# Patient Record
Sex: MALE | Race: WHITE | NOT HISPANIC OR LATINO | Employment: FULL TIME | URBAN - METROPOLITAN AREA
[De-identification: names, ages, dates, MRNs, and addresses within clinical notes are randomized per-mention and may not be internally consistent; named-entity substitution may affect disease eponyms.]

---

## 2023-09-13 ENCOUNTER — APPOINTMENT (OUTPATIENT)
Dept: PRIMARY CARE | Facility: CLINIC | Age: 53
End: 2023-09-13
Payer: COMMERCIAL

## 2023-09-18 DIAGNOSIS — Z00.00 HEALTHCARE MAINTENANCE: Primary | ICD-10-CM

## 2023-09-18 NOTE — PROGRESS NOTES
Requesting lab work ahead of CPE.    Referred To Mid-Level For Closure Text (Leave Blank If You Do Not Want): After obtaining clear surgical margins the patient was sent to a mid-level provider for surgical repair.  The patient understands they will receive post-surgical care and follow-up from the mid-level provider.

## 2023-09-20 ENCOUNTER — OFFICE VISIT (OUTPATIENT)
Dept: PRIMARY CARE | Facility: CLINIC | Age: 53
End: 2023-09-20
Payer: COMMERCIAL

## 2023-09-20 ENCOUNTER — LAB (OUTPATIENT)
Dept: LAB | Facility: LAB | Age: 53
End: 2023-09-20
Payer: COMMERCIAL

## 2023-09-20 VITALS
HEIGHT: 66 IN | OXYGEN SATURATION: 96 % | HEART RATE: 93 BPM | DIASTOLIC BLOOD PRESSURE: 97 MMHG | TEMPERATURE: 97.8 F | BODY MASS INDEX: 33.91 KG/M2 | SYSTOLIC BLOOD PRESSURE: 153 MMHG | WEIGHT: 211 LBS

## 2023-09-20 DIAGNOSIS — I10 BENIGN ESSENTIAL HYPERTENSION: ICD-10-CM

## 2023-09-20 DIAGNOSIS — Z00.00 HEALTHCARE MAINTENANCE: Primary | ICD-10-CM

## 2023-09-20 DIAGNOSIS — E66.09 CLASS 1 OBESITY DUE TO EXCESS CALORIES WITHOUT SERIOUS COMORBIDITY WITH BODY MASS INDEX (BMI) OF 34.0 TO 34.9 IN ADULT: ICD-10-CM

## 2023-09-20 DIAGNOSIS — Z00.00 HEALTHCARE MAINTENANCE: ICD-10-CM

## 2023-09-20 PROBLEM — R97.20 ELEVATED PSA: Status: RESOLVED | Noted: 2023-09-20 | Resolved: 2023-09-20

## 2023-09-20 PROBLEM — H61.20 IMPACTED CERUMEN: Status: RESOLVED | Noted: 2023-09-20 | Resolved: 2023-09-20

## 2023-09-20 LAB
ALANINE AMINOTRANSFERASE (SGPT) (U/L) IN SER/PLAS: 28 U/L (ref 10–52)
ALBUMIN (G/DL) IN SER/PLAS: 4.8 G/DL (ref 3.4–5)
ALKALINE PHOSPHATASE (U/L) IN SER/PLAS: 55 U/L (ref 33–120)
ANION GAP IN SER/PLAS: 14 MMOL/L (ref 10–20)
ASPARTATE AMINOTRANSFERASE (SGOT) (U/L) IN SER/PLAS: 20 U/L (ref 9–39)
BASOPHILS (10*3/UL) IN BLOOD BY AUTOMATED COUNT: 0.03 X10E9/L (ref 0–0.1)
BASOPHILS/100 LEUKOCYTES IN BLOOD BY AUTOMATED COUNT: 0.5 % (ref 0–2)
BILIRUBIN TOTAL (MG/DL) IN SER/PLAS: 0.8 MG/DL (ref 0–1.2)
CALCIUM (MG/DL) IN SER/PLAS: 10.4 MG/DL (ref 8.6–10.6)
CARBON DIOXIDE, TOTAL (MMOL/L) IN SER/PLAS: 28 MMOL/L (ref 21–32)
CHLORIDE (MMOL/L) IN SER/PLAS: 101 MMOL/L (ref 98–107)
CHOLESTEROL (MG/DL) IN SER/PLAS: 204 MG/DL (ref 0–199)
CHOLESTEROL IN HDL (MG/DL) IN SER/PLAS: 55.3 MG/DL
CHOLESTEROL/HDL RATIO: 3.7
CREATININE (MG/DL) IN SER/PLAS: 1.17 MG/DL (ref 0.5–1.3)
EOSINOPHILS (10*3/UL) IN BLOOD BY AUTOMATED COUNT: 0.03 X10E9/L (ref 0–0.7)
EOSINOPHILS/100 LEUKOCYTES IN BLOOD BY AUTOMATED COUNT: 0.5 % (ref 0–6)
ERYTHROCYTE DISTRIBUTION WIDTH (RATIO) BY AUTOMATED COUNT: 12.1 % (ref 11.5–14.5)
ERYTHROCYTE MEAN CORPUSCULAR HEMOGLOBIN CONCENTRATION (G/DL) BY AUTOMATED: 33.2 G/DL (ref 32–36)
ERYTHROCYTE MEAN CORPUSCULAR VOLUME (FL) BY AUTOMATED COUNT: 87 FL (ref 80–100)
ERYTHROCYTES (10*6/UL) IN BLOOD BY AUTOMATED COUNT: 5.53 X10E12/L (ref 4.5–5.9)
ESTIMATED AVERAGE GLUCOSE FOR HBA1C: 103 MG/DL
GFR MALE: 74 ML/MIN/1.73M2
GLUCOSE (MG/DL) IN SER/PLAS: 89 MG/DL (ref 74–99)
HEMATOCRIT (%) IN BLOOD BY AUTOMATED COUNT: 47.9 % (ref 41–52)
HEMOGLOBIN (G/DL) IN BLOOD: 15.9 G/DL (ref 13.5–17.5)
HEMOGLOBIN A1C/HEMOGLOBIN TOTAL IN BLOOD: 5.2 %
IMMATURE GRANULOCYTES/100 LEUKOCYTES IN BLOOD BY AUTOMATED COUNT: 0.3 % (ref 0–0.9)
LDL: 126 MG/DL (ref 0–99)
LEUKOCYTES (10*3/UL) IN BLOOD BY AUTOMATED COUNT: 6.7 X10E9/L (ref 4.4–11.3)
LYMPHOCYTES (10*3/UL) IN BLOOD BY AUTOMATED COUNT: 2.1 X10E9/L (ref 1.2–4.8)
LYMPHOCYTES/100 LEUKOCYTES IN BLOOD BY AUTOMATED COUNT: 31.6 % (ref 13–44)
MONOCYTES (10*3/UL) IN BLOOD BY AUTOMATED COUNT: 0.58 X10E9/L (ref 0.1–1)
MONOCYTES/100 LEUKOCYTES IN BLOOD BY AUTOMATED COUNT: 8.7 % (ref 2–10)
NEUTROPHILS (10*3/UL) IN BLOOD BY AUTOMATED COUNT: 3.89 X10E9/L (ref 1.2–7.7)
NEUTROPHILS/100 LEUKOCYTES IN BLOOD BY AUTOMATED COUNT: 58.4 % (ref 40–80)
NRBC (PER 100 WBCS) BY AUTOMATED COUNT: 0 /100 WBC (ref 0–0)
PLATELETS (10*3/UL) IN BLOOD AUTOMATED COUNT: 274 X10E9/L (ref 150–450)
POTASSIUM (MMOL/L) IN SER/PLAS: 4.5 MMOL/L (ref 3.5–5.3)
PROSTATE SPECIFIC ANTIGEN,SCREEN: 4.37 NG/ML (ref 0–4)
PROTEIN TOTAL: 7.8 G/DL (ref 6.4–8.2)
SODIUM (MMOL/L) IN SER/PLAS: 138 MMOL/L (ref 136–145)
TRIGLYCERIDE (MG/DL) IN SER/PLAS: 115 MG/DL (ref 0–149)
UREA NITROGEN (MG/DL) IN SER/PLAS: 17 MG/DL (ref 6–23)
VLDL: 23 MG/DL (ref 0–40)

## 2023-09-20 PROCEDURE — 90471 IMMUNIZATION ADMIN: CPT | Performed by: STUDENT IN AN ORGANIZED HEALTH CARE EDUCATION/TRAINING PROGRAM

## 2023-09-20 PROCEDURE — 80053 COMPREHEN METABOLIC PANEL: CPT

## 2023-09-20 PROCEDURE — 99396 PREV VISIT EST AGE 40-64: CPT | Performed by: STUDENT IN AN ORGANIZED HEALTH CARE EDUCATION/TRAINING PROGRAM

## 2023-09-20 PROCEDURE — 1036F TOBACCO NON-USER: CPT | Performed by: STUDENT IN AN ORGANIZED HEALTH CARE EDUCATION/TRAINING PROGRAM

## 2023-09-20 PROCEDURE — 3077F SYST BP >= 140 MM HG: CPT | Performed by: STUDENT IN AN ORGANIZED HEALTH CARE EDUCATION/TRAINING PROGRAM

## 2023-09-20 PROCEDURE — 80061 LIPID PANEL: CPT

## 2023-09-20 PROCEDURE — 36415 COLL VENOUS BLD VENIPUNCTURE: CPT

## 2023-09-20 PROCEDURE — 3008F BODY MASS INDEX DOCD: CPT | Performed by: STUDENT IN AN ORGANIZED HEALTH CARE EDUCATION/TRAINING PROGRAM

## 2023-09-20 PROCEDURE — 84153 ASSAY OF PSA TOTAL: CPT

## 2023-09-20 PROCEDURE — 85025 COMPLETE CBC W/AUTO DIFF WBC: CPT

## 2023-09-20 PROCEDURE — 90686 IIV4 VACC NO PRSV 0.5 ML IM: CPT | Performed by: STUDENT IN AN ORGANIZED HEALTH CARE EDUCATION/TRAINING PROGRAM

## 2023-09-20 PROCEDURE — 83036 HEMOGLOBIN GLYCOSYLATED A1C: CPT

## 2023-09-20 PROCEDURE — 90750 HZV VACC RECOMBINANT IM: CPT | Performed by: STUDENT IN AN ORGANIZED HEALTH CARE EDUCATION/TRAINING PROGRAM

## 2023-09-20 PROCEDURE — 90472 IMMUNIZATION ADMIN EACH ADD: CPT | Performed by: STUDENT IN AN ORGANIZED HEALTH CARE EDUCATION/TRAINING PROGRAM

## 2023-09-20 PROCEDURE — 3080F DIAST BP >= 90 MM HG: CPT | Performed by: STUDENT IN AN ORGANIZED HEALTH CARE EDUCATION/TRAINING PROGRAM

## 2023-09-20 RX ORDER — LISINOPRIL 20 MG/1
20 TABLET ORAL DAILY
Qty: 30 TABLET | Refills: 5 | Status: SHIPPED | OUTPATIENT
Start: 2023-09-20 | End: 2024-03-07

## 2023-09-20 RX ORDER — FLUTICASONE PROPIONATE 50 MCG
1 SPRAY, SUSPENSION (ML) NASAL 2 TIMES DAILY
COMMUNITY
Start: 2022-03-16

## 2023-09-20 RX ORDER — AMLODIPINE BESYLATE 10 MG/1
10 TABLET ORAL DAILY
COMMUNITY
End: 2024-03-01

## 2023-09-20 NOTE — PATIENT INSTRUCTIONS
Your lab work is pending at time of this note.     I will contact you with the results at my soonest convenience. I strongly urge you to use [employer] as this is the quickest and easiest way to access your results and recieve my correspondences.     I have added or changed your medications today. See the medication section of this packet for full details.      You received your flu shot today!    You received your first shingrix shot today! Follow up in 2-6 months to complete this two-step series.      Schedule a follow up the first week of December.

## 2023-09-20 NOTE — PROGRESS NOTES
"Subjective   Patient ID: Kale Rachel is a 53 y.o. male who presents for No chief complaint on file..    HPI       Re: ?HTN - elevated BP once again despite being on amlodipine. Has lost ~10lb as tax season has ended, but still not at goal blood pressure. Reports no sx high or low from HTN; denies blurry vision, HA, dizziness LoC CP SoB Galeas and leg swelling.     Re: HM - due for routine blood work, done this AM, results pending. Immunizations UTD. CRS and PSA are current.      PMHx, FHx, Social Hx, Surg Hx personally reviewed at this appointment. No pertinent findings and/or changes from prior (if applicable).     ROS: Denies wt gain/loss f/c HA LoC CP SOB NVDC. See HPI above, and scanned sheet (if applicable). All other systems are reviewed and are without complaint.        Review of Systems    Objective   BP (!) 153/97   Pulse 93   Temp 36.6 °C (97.8 °F)   Ht 1.676 m (5' 6\")   Wt 95.7 kg (211 lb)   SpO2 96%   BMI 34.06 kg/m²     Physical Exam  Gen: obese, NAD. AAO x3.  HEENT: NC/AT. Anicteric sclera, symmetric pupils. MMM no thrush.  Neck: Soft, supple. No LAD. No goiter.  CV: RRR nl s1s2 no m/r/g  Pulm: CTAB no w/r/r, good air exchange  GI: obese, soft NTND BS+ no hsm  Ext: WWP no edema  Neuro: II-XII grossly intact, nonfocal systemic findings  MSK: 5/5 strength b/l UE and LE  Gait: unremarkable     Lab Results   Component Value Date    WBC 5.8 07/12/2021    HGB 15.0 07/12/2021    HCT 43.0 07/12/2021     07/12/2021    CHOL 223 (H) 03/02/2021    TRIG 125 03/02/2021    HDL 51.5 03/02/2021    ALT 24 07/12/2021    AST 21 07/12/2021     07/12/2021    K 4.5 07/12/2021     07/12/2021    CREATININE 1.11 07/12/2021    BUN 18 07/12/2021    CO2 30 07/12/2021    TSH 0.95 05/09/2019    PSA 3.18 11/11/2019    HGBA1C 5.1 05/09/2019     par    Assessment/Plan   # HTN: not at goal  - ambulatory pressures, RTC 4-8 weeks with BP log  - routine blood/urine work, if not recent  - lifestyle modifications " discussed at length   - add Lisinopril 20mg to regimen    # Obesity  - counselled on wt loss via diet, exercise, and other lifestyle modifications     # Health Maintenance  - routine blood work  - Colonoscopy: Cologuard 2021, repeat 2024  - PSA: UTD  - Immunizations: VZV 1 of 2, flu shot      Problem List Items Addressed This Visit       Benign essential hypertension    Relevant Medications    lisinopril 20 mg tablet     Other Visit Diagnoses       Healthcare maintenance    -  Primary    Class 1 obesity due to excess calories without serious comorbidity with body mass index (BMI) of 34.0 to 34.9 in adult

## 2023-09-26 PROBLEM — Z79.2 NEED FOR PROPHYLACTIC ANTIBIOTIC: Status: ACTIVE | Noted: 2023-09-26

## 2023-09-26 PROBLEM — R07.89 ATYPICAL CHEST PAIN: Status: ACTIVE | Noted: 2023-09-26

## 2023-09-26 PROBLEM — R30.0 DYSURIA: Status: ACTIVE | Noted: 2023-09-26

## 2023-09-26 PROBLEM — H90.12 CONDUCTIVE HEARING LOSS OF LEFT EAR WITH UNRESTRICTED HEARING OF RIGHT EAR: Status: ACTIVE | Noted: 2023-09-26

## 2023-09-26 PROBLEM — H93.19 TINNITUS: Status: ACTIVE | Noted: 2023-09-26

## 2023-09-26 PROBLEM — R03.0 ELEVATED BLOOD PRESSURE READING: Status: ACTIVE | Noted: 2023-09-26

## 2023-09-26 PROBLEM — Z86.39 HISTORY OF OBESITY: Status: ACTIVE | Noted: 2023-09-26

## 2023-09-26 PROBLEM — R33.9 INCOMPLETE BLADDER EMPTYING: Status: ACTIVE | Noted: 2023-09-26

## 2023-09-26 PROBLEM — H93.8X2 PRESSURE SENSATION IN LEFT EAR: Status: ACTIVE | Noted: 2023-09-26

## 2023-09-26 PROBLEM — N52.9 ERECTILE DYSFUNCTION: Status: ACTIVE | Noted: 2023-09-26

## 2023-09-26 PROBLEM — I86.1 VARICOCELE: Status: ACTIVE | Noted: 2023-09-26

## 2023-09-26 PROBLEM — H91.90 HEARING LOSS: Status: ACTIVE | Noted: 2023-09-26

## 2023-09-26 RX ORDER — MELOXICAM 15 MG/1
15 TABLET ORAL DAILY PRN
COMMUNITY

## 2023-10-02 ENCOUNTER — TREATMENT (OUTPATIENT)
Dept: PHYSICAL THERAPY | Facility: CLINIC | Age: 53
End: 2023-10-02
Payer: COMMERCIAL

## 2023-10-02 DIAGNOSIS — M54.9 RIGHT-SIDED BACK PAIN: Primary | ICD-10-CM

## 2023-10-02 DIAGNOSIS — M54.9 BACK PAIN: ICD-10-CM

## 2023-10-02 PROCEDURE — 97110 THERAPEUTIC EXERCISES: CPT | Mod: GP

## 2023-10-02 NOTE — PROGRESS NOTES
Physical Therapy  Physical Therapy Treatment    Patient Name: Kale Rachel  MRN: [id number]  Today's Date: 10/2/2023  Time Calculation  Start Time: 1822      Assessment: reported feeling better post treatment and more challenged with balance activities on R.  Improved since starting therapy.  Would benefit from a few additional therapy sessions to make sure he knows how to control and better manage his pain via correct HEP and postural modifications.        Plan: 1 time a week for 2-3 additional sessions.    Core work, balance stability     Subjective:   Subjective   Patient reports no pain currently.  Had pain for about 2 days after last visit but after that it was gone.  This past weekend went to beach and picked up beach glass then went hiking about 4 miles.  Had some soreness on R hip since then but that has resolved.     Objective:   Objective   L/S ROM  Flexion WFL  Ext WFL  SB WFL but pain R with L SB    Lower quarter screen:  mild pain on R going L  Repeated movements: NT    Core strength supine sit arms straight    HS   R 90  L 80      Treatments:   Therapeutic Exercise  Therapeutic Exercise Activity 1: Bike 5 min  Therapeutic Exercise Activity 2: press ups 10 x  Therapeutic Exercise Activity 3: prone  shoulder flexion 10 x  Therapeutic Exercise Activity 4: prone hip ext 10 x  Therapeutic Exercise Activity 5: bird/dog prone 10 x 2  Therapeutic Exercise Activity 6: alt heel tap 20 x  Therapeutic Exercise Activity 7: bicycles cw & ccw 15 x  Therapeutic Exercise Activity 8: scissors 10 x  Therapeutic Exercise Activity 9: palloff press 10# 12 x 2 B  Therapeutic Exercise Activity 10: farmers carry 12 kg 4 x 30'  Therapeutic Exercise Activity 11: plank 10 x 5 sec hold  Therapeutic Exercise Activity 12: row 10# 12 x3         Zenaida Veronica, PT

## 2023-12-04 ENCOUNTER — LAB (OUTPATIENT)
Dept: LAB | Facility: LAB | Age: 53
End: 2023-12-04
Payer: COMMERCIAL

## 2023-12-04 ENCOUNTER — OFFICE VISIT (OUTPATIENT)
Dept: PRIMARY CARE | Facility: CLINIC | Age: 53
End: 2023-12-04
Payer: COMMERCIAL

## 2023-12-04 VITALS
HEIGHT: 66 IN | OXYGEN SATURATION: 97 % | HEART RATE: 68 BPM | WEIGHT: 215.4 LBS | DIASTOLIC BLOOD PRESSURE: 96 MMHG | SYSTOLIC BLOOD PRESSURE: 141 MMHG | BODY MASS INDEX: 34.62 KG/M2

## 2023-12-04 DIAGNOSIS — R97.20 ELEVATED PSA: ICD-10-CM

## 2023-12-04 DIAGNOSIS — G89.29 CHRONIC RIGHT SHOULDER PAIN: ICD-10-CM

## 2023-12-04 DIAGNOSIS — I10 BENIGN ESSENTIAL HYPERTENSION: Primary | ICD-10-CM

## 2023-12-04 DIAGNOSIS — M25.511 CHRONIC RIGHT SHOULDER PAIN: ICD-10-CM

## 2023-12-04 LAB — PSA SERPL-MCNC: 4.28 NG/ML

## 2023-12-04 PROCEDURE — 99214 OFFICE O/P EST MOD 30 MIN: CPT | Performed by: STUDENT IN AN ORGANIZED HEALTH CARE EDUCATION/TRAINING PROGRAM

## 2023-12-04 PROCEDURE — 1036F TOBACCO NON-USER: CPT | Performed by: STUDENT IN AN ORGANIZED HEALTH CARE EDUCATION/TRAINING PROGRAM

## 2023-12-04 PROCEDURE — 3080F DIAST BP >= 90 MM HG: CPT | Performed by: STUDENT IN AN ORGANIZED HEALTH CARE EDUCATION/TRAINING PROGRAM

## 2023-12-04 PROCEDURE — 90471 IMMUNIZATION ADMIN: CPT | Performed by: STUDENT IN AN ORGANIZED HEALTH CARE EDUCATION/TRAINING PROGRAM

## 2023-12-04 PROCEDURE — 3077F SYST BP >= 140 MM HG: CPT | Performed by: STUDENT IN AN ORGANIZED HEALTH CARE EDUCATION/TRAINING PROGRAM

## 2023-12-04 PROCEDURE — 90750 HZV VACC RECOMBINANT IM: CPT | Performed by: STUDENT IN AN ORGANIZED HEALTH CARE EDUCATION/TRAINING PROGRAM

## 2023-12-04 PROCEDURE — 84153 ASSAY OF PSA TOTAL: CPT

## 2023-12-04 PROCEDURE — 3008F BODY MASS INDEX DOCD: CPT | Performed by: STUDENT IN AN ORGANIZED HEALTH CARE EDUCATION/TRAINING PROGRAM

## 2023-12-04 PROCEDURE — 36415 COLL VENOUS BLD VENIPUNCTURE: CPT

## 2023-12-04 RX ORDER — HYDROCHLOROTHIAZIDE 25 MG/1
25 TABLET ORAL DAILY
Qty: 30 TABLET | Refills: 5 | Status: SHIPPED | OUTPATIENT
Start: 2023-12-04 | End: 2024-03-28

## 2023-12-04 NOTE — PROGRESS NOTES
"Subjective   Patient ID: Kale Rachel is a 53 y.o. male who presents for No chief complaint on file..    HPI   Re: HTN - On combo therapy, still not at goal. Willing to add a third agent on.  Reports no sx high or low from HTN; denies blurry vision, HA, dizziness LoC CP SoB Galeas and leg swelling.    Re: shoulder - Re: shoulder - subacute on chronic onset of shoulder pain. No specific injury but has been lingering for a while now since doing free weights. Has tried some conservative therapy (NSAIDs, stretching) but has not yet tried PT. Range of motion is very good but not 100%.      Re: HM -lab work current. CRS and PSA UTD. VZV #2 today.      PMHx, FHx, Social Hx, Surg Hx personally reviewed at this appointment. No pertinent findings and/or changes from prior (if applicable).     ROS: Denies wt gain/loss f/c HA LoC CP SOB NVDC. See HPI above, and scanned sheet (if applicable). All other systems are reviewed and are without complaint.   Review of Systems    Objective   BP (!) 141/96   Pulse 68   Ht 1.676 m (5' 6\")   Wt 97.7 kg (215 lb 6.4 oz)   SpO2 97%   BMI 34.77 kg/m²     Physical Exam  Gen: obese, NAD. AAO x3.  HEENT: NC/AT. Anicteric sclera, symmetric pupils. MMM no thrush.  Neck: Soft, supple. No LAD. No goiter.  CV: RRR nl s1s2 no m/r/g  Pulm: CTAB no w/r/r, good air exchange  GI: obese, soft NTND BS+ no hsm  Ext: WWP no edema  Neuro: II-XII grossly intact, nonfocal systemic findings  MSK: 5/5 strength b/l UE and LE  Gait: unremarkable     Lab Results   Component Value Date    WBC 6.7 09/20/2023    HGB 15.9 09/20/2023    HCT 47.9 09/20/2023     09/20/2023    CHOL 204 (H) 09/20/2023    TRIG 115 09/20/2023    HDL 55.3 09/20/2023    ALT 28 09/20/2023    AST 20 09/20/2023     09/20/2023    K 4.5 09/20/2023     09/20/2023    CREATININE 1.17 09/20/2023    BUN 17 09/20/2023    CO2 28 09/20/2023    TSH 0.95 05/09/2019    PSA 3.18 11/11/2019    HGBA1C 5.2 09/20/2023 "     par  =      Assessment/Plan   # HTN: not at goal  - ambulatory pressures, RTC 4-8 weeks with BP log  - routine blood/urine work, if not recent  - lifestyle modifications discussed at length  - add hydrochlorothiazide    # Shoulder pain: acute on chronic  - conservative therapy, PT  - shoulder referral per pt request    # Obesity: BMI > 30  - counselled on wt loss via diet, exercise, and other lifestyle modifications     # Health Maintenance  - routine blood work  - Colon Cancer Screening: UTD, repeat 2024 (cologuard)  - PSA: recheck as mildly elevated  - Immunizations: VZV 2  - AAA screening: not indicated

## 2023-12-04 NOTE — PATIENT INSTRUCTIONS
Please stop at the lab ([address]) to complete your blood and/or urine work that I've ordered for you.    I will contact you with the results at my soonest convenience. I strongly urge you to use [employer] as this is the quickest and easiest way to access your results and receive my correspondences.    I have added or changed your medications today. See the medication section of this packet for full details.      I have referred you to orthopedics for your musculoskeletal injury or pain. [phone] to schedule.     You have completed your shingles series today!

## 2024-01-10 ENCOUNTER — DOCUMENTATION (OUTPATIENT)
Dept: PHYSICAL THERAPY | Facility: CLINIC | Age: 54
End: 2024-01-10
Payer: COMMERCIAL

## 2024-01-10 NOTE — PROGRESS NOTES
Discharge Summary    Name: Kale Rachel  MRN: [id number]  : 1970  Date: 01/10/24    Discharge Summary: PT    Discharge Information: Date of discharge 1/10/24, Date of last visit 10/2/23, Date of evaluation 23, Number of attended visits 5, Referred by vaclav, and Referred for LBP    Therapy Summary: back pain    Discharge Status: improved     Rehab Discharge Reason: Other scheduling conflicts

## 2024-03-01 DIAGNOSIS — I10 ESSENTIAL (PRIMARY) HYPERTENSION: ICD-10-CM

## 2024-03-01 RX ORDER — AMLODIPINE BESYLATE 10 MG/1
10 TABLET ORAL DAILY
Qty: 90 TABLET | Refills: 3 | Status: SHIPPED | OUTPATIENT
Start: 2024-03-01

## 2024-03-06 DIAGNOSIS — I10 BENIGN ESSENTIAL HYPERTENSION: ICD-10-CM

## 2024-03-07 RX ORDER — LISINOPRIL 20 MG/1
20 TABLET ORAL DAILY
Qty: 90 TABLET | Refills: 2 | Status: SHIPPED | OUTPATIENT
Start: 2024-03-07

## 2024-03-28 ENCOUNTER — HOSPITAL ENCOUNTER (OUTPATIENT)
Dept: RADIOLOGY | Facility: CLINIC | Age: 54
Discharge: HOME | End: 2024-03-28
Payer: COMMERCIAL

## 2024-03-28 DIAGNOSIS — R05.1 ACUTE COUGH: ICD-10-CM

## 2024-03-28 DIAGNOSIS — I10 BENIGN ESSENTIAL HYPERTENSION: ICD-10-CM

## 2024-03-28 PROCEDURE — 71046 X-RAY EXAM CHEST 2 VIEWS: CPT | Performed by: RADIOLOGY

## 2024-03-28 PROCEDURE — 71046 X-RAY EXAM CHEST 2 VIEWS: CPT

## 2024-03-28 RX ORDER — HYDROCHLOROTHIAZIDE 25 MG/1
25 TABLET ORAL DAILY
Qty: 90 TABLET | Refills: 2 | Status: SHIPPED | OUTPATIENT
Start: 2024-03-28

## 2024-07-18 ENCOUNTER — OFFICE VISIT (OUTPATIENT)
Dept: ORTHOPEDIC SURGERY | Facility: CLINIC | Age: 54
End: 2024-07-18
Payer: COMMERCIAL

## 2024-07-18 ENCOUNTER — HOSPITAL ENCOUNTER (OUTPATIENT)
Dept: RADIOLOGY | Facility: CLINIC | Age: 54
Discharge: HOME | End: 2024-07-18
Payer: COMMERCIAL

## 2024-07-18 DIAGNOSIS — M54.16 LUMBAR RADICULOPATHY: ICD-10-CM

## 2024-07-18 PROCEDURE — 72110 X-RAY EXAM L-2 SPINE 4/>VWS: CPT

## 2024-07-18 PROCEDURE — 72110 X-RAY EXAM L-2 SPINE 4/>VWS: CPT | Performed by: RADIOLOGY

## 2024-07-18 PROCEDURE — 99212 OFFICE O/P EST SF 10 MIN: CPT | Performed by: PHYSICIAN ASSISTANT

## 2024-07-19 NOTE — PROGRESS NOTES
Kale returns today for a follow up.    LOV was Sep 2023.    At that time, we were following him in regards to his LBP with lower extremity symptoms. The patient was partaking in PT and initially had good success. Unfortunately though, since winter his sxs did return. He re started PT in October 2023.    Today, he states he is having recurrent LBP with R LE radicular sxs. He has been doing the stretches and program he was taught from PT but its not improving.    We have been following this now since July 2023.     He has attended physical therapy that has been documented from  October 2,2023 until Nash 10,2024 with solid improvement. He is taking NSAIDS prn and it still wont resolve.    Const: Well-appearing, well-nourished [male] in no distress.  Eyes: Normal appearing sclera and conjunctiva, no jaundice, pupils normal in appearance  Neck: No masses or lymphadenopathy appreciated  Resp: breathing comfortably, normal respiratory rate  CV: No upper or lower extremity edema.  Musculoskeletal: [Normal gait]. [Able to heel/toe walk without trouble].  [ Strength exam of  lower extremities reveals 5/5 strength in all major muscle groups [No intrinsic wasting.] [Negative] Spurling sign.  [Negative] straight leg raise [bilaterally].  Neuro: Sensation is intact and equal bilaterally. Deep tendon reflexes are [normal and symmetric].  [No clonus], [negative] Christian sign, [negative] Lhermitte's sign  Skin: Intact without any lesions, normal turgor  Psych: Alert and oriented x3, normal mood and affect    We are going to get XR after the visit    Moving forward:  -order MRI L spine  -follow up after its done to review results    This note was dictated using speech recognition software and was not corrected for spelling or grammatical errors

## 2024-08-19 ENCOUNTER — HOSPITAL ENCOUNTER (OUTPATIENT)
Dept: RADIOLOGY | Facility: HOSPITAL | Age: 54
Discharge: HOME | End: 2024-08-19
Payer: COMMERCIAL

## 2024-08-19 DIAGNOSIS — M54.16 LUMBAR RADICULOPATHY: ICD-10-CM

## 2024-08-19 PROCEDURE — 72148 MRI LUMBAR SPINE W/O DYE: CPT

## 2024-08-19 PROCEDURE — 72148 MRI LUMBAR SPINE W/O DYE: CPT | Performed by: RADIOLOGY

## 2024-09-10 ENCOUNTER — APPOINTMENT (OUTPATIENT)
Dept: PRIMARY CARE | Facility: CLINIC | Age: 54
End: 2024-09-10
Payer: COMMERCIAL

## 2024-09-12 ENCOUNTER — APPOINTMENT (OUTPATIENT)
Dept: ORTHOPEDIC SURGERY | Facility: CLINIC | Age: 54
End: 2024-09-12
Payer: COMMERCIAL

## 2024-09-12 DIAGNOSIS — M54.16 LUMBAR RADICULOPATHY: Primary | ICD-10-CM

## 2024-09-12 PROCEDURE — 99212 OFFICE O/P EST SF 10 MIN: CPT | Performed by: PHYSICIAN ASSISTANT

## 2024-09-12 ASSESSMENT — PAIN - FUNCTIONAL ASSESSMENT: PAIN_FUNCTIONAL_ASSESSMENT: 0-10

## 2024-09-12 ASSESSMENT — PAIN SCALES - GENERAL: PAINLEVEL_OUTOF10: 1

## 2024-09-13 PROBLEM — M54.16 LUMBAR RADICULOPATHY: Status: ACTIVE | Noted: 2024-09-13

## 2024-09-13 NOTE — PROGRESS NOTES
Kale returns today to review the MRI L spine.    Results were reviewed with him and all his questions and concerns were answered appropriately.     The results revealed that he does not have any signs of spinal stenosis.He as various degrees of mild discs and mild foraminal narrowing.    We discussed managing this conservatively with cont PT, exercise and a referral for PMR.    He will keep me updated and follow up prn.    I reviewed the complete 30-point review of systems that was documented on the scanned patient intake form.  All other systems are non-contributory except as defined in history of present illness.    This note was dictated using speech recognition software and was not corrected for spelling or grammatical errors

## 2024-09-26 ENCOUNTER — APPOINTMENT (OUTPATIENT)
Dept: PRIMARY CARE | Facility: CLINIC | Age: 54
End: 2024-09-26
Payer: COMMERCIAL

## 2024-10-08 ENCOUNTER — APPOINTMENT (OUTPATIENT)
Dept: DERMATOLOGY | Facility: CLINIC | Age: 54
End: 2024-10-08
Payer: COMMERCIAL

## 2024-10-08 DIAGNOSIS — D18.01 HEMANGIOMA OF SKIN: ICD-10-CM

## 2024-10-08 DIAGNOSIS — L82.1 SEBORRHEIC KERATOSIS: Primary | ICD-10-CM

## 2024-10-08 PROCEDURE — 1036F TOBACCO NON-USER: CPT | Performed by: DERMATOLOGY

## 2024-10-08 PROCEDURE — 99203 OFFICE O/P NEW LOW 30 MIN: CPT | Performed by: DERMATOLOGY

## 2024-10-08 ASSESSMENT — DERMATOLOGY QUALITY OF LIFE (QOL) ASSESSMENT
RATE HOW EMOTIONALLY BOTHERED YOU ARE BY YOUR SKIN PROBLEM (FOR EXAMPLE, WORRY, EMBARRASSMENT, FRUSTRATION): 3
RATE HOW BOTHERED YOU ARE BY SYMPTOMS OF YOUR SKIN PROBLEM (EG, ITCHING, STINGING BURNING, HURTING OR SKIN IRRITATION): 0 - NEVER BOTHERED
RATE HOW BOTHERED YOU ARE BY EFFECTS OF YOUR SKIN PROBLEMS ON YOUR ACTIVITIES (EG, GOING OUT, ACCOMPLISHING WHAT YOU WANT, WORK ACTIVITIES OR YOUR RELATIONSHIPS WITH OTHERS): 0 - NEVER BOTHERED
RATE HOW BOTHERED YOU ARE BY EFFECTS OF YOUR SKIN PROBLEMS ON YOUR ACTIVITIES (EG, GOING OUT, ACCOMPLISHING WHAT YOU WANT, WORK ACTIVITIES OR YOUR RELATIONSHIPS WITH OTHERS): 0 - NEVER BOTHERED
RATE HOW EMOTIONALLY BOTHERED YOU ARE BY YOUR SKIN PROBLEM (FOR EXAMPLE, WORRY, EMBARRASSMENT, FRUSTRATION): 3
RATE HOW BOTHERED YOU ARE BY SYMPTOMS OF YOUR SKIN PROBLEM (EG, ITCHING, STINGING BURNING, HURTING OR SKIN IRRITATION): 0 - NEVER BOTHERED

## 2024-10-08 NOTE — PROGRESS NOTES
Subjective     Kale Rachel is a 54 y.o. male who presents for the following: Suspicious Skin Lesion (Pt presents to office for evaluation of single lesion on the abdomen that he noticed changing a couple of weeks ago.  No personal or family history.).     Review of Systems:  No other skin or systemic complaints other than what is documented elsewhere in the note.    The following portions of the chart were reviewed this encounter and updated as appropriate:   Tobacco  Allergies  Meds  Problems  Med Hx  Surg Hx  Fam Hx             Specialty Problems    None       Objective   Well appearing patient in no apparent distress; mood and affect are within normal limits.    A focused skin examination was performed. All findings within normal limits unless otherwise noted below.    Assessment/Plan   1. Seborrheic keratosis  Left Abdomen (side) - Upper  Stuck on, waxy macule(s)/papule(s)/plaque(s) with comedo-like openings and milia like cysts    (lesion of patient's concern)  -Discussed the nature of the diagnosis  -Reassurance, recommend continued observation  -Educational pamphlet regarding this diagnosis given to the patient today     2. Hemangioma of skin  Right Abdomen (side) - Upper  Cherry red papules    -Discussed the nature of the diagnosis  -Reassurance, recommend continued observation        Follow up for FSE next available  Discussed if there are any changes or development of concerning symptoms (lesion/skin condition is changing, bleeding, enlarging, or worsening) the patient is to contact my office. The patient verbalizes understanding.    Eden Archer MD  10/8/2024

## 2024-10-22 ENCOUNTER — APPOINTMENT (OUTPATIENT)
Dept: PRIMARY CARE | Facility: CLINIC | Age: 54
End: 2024-10-22
Payer: COMMERCIAL

## 2024-10-22 VITALS
WEIGHT: 211 LBS | DIASTOLIC BLOOD PRESSURE: 82 MMHG | OXYGEN SATURATION: 96 % | HEART RATE: 65 BPM | BODY MASS INDEX: 33.91 KG/M2 | SYSTOLIC BLOOD PRESSURE: 125 MMHG | HEIGHT: 66 IN | TEMPERATURE: 97.3 F

## 2024-10-22 DIAGNOSIS — R97.20 ELEVATED PSA: ICD-10-CM

## 2024-10-22 DIAGNOSIS — N40.1 BENIGN PROSTATIC HYPERPLASIA WITH NOCTURIA: ICD-10-CM

## 2024-10-22 DIAGNOSIS — R35.1 BENIGN PROSTATIC HYPERPLASIA WITH NOCTURIA: ICD-10-CM

## 2024-10-22 DIAGNOSIS — Z12.11 ENCOUNTER FOR SCREENING FOR MALIGNANT NEOPLASM OF COLON: ICD-10-CM

## 2024-10-22 DIAGNOSIS — Z00.00 HEALTHCARE MAINTENANCE: Primary | ICD-10-CM

## 2024-10-22 DIAGNOSIS — E66.09 CLASS 1 OBESITY DUE TO EXCESS CALORIES WITHOUT SERIOUS COMORBIDITY WITH BODY MASS INDEX (BMI) OF 34.0 TO 34.9 IN ADULT: ICD-10-CM

## 2024-10-22 DIAGNOSIS — E66.811 CLASS 1 OBESITY DUE TO EXCESS CALORIES WITHOUT SERIOUS COMORBIDITY WITH BODY MASS INDEX (BMI) OF 34.0 TO 34.9 IN ADULT: ICD-10-CM

## 2024-10-22 PROCEDURE — 3074F SYST BP LT 130 MM HG: CPT | Performed by: STUDENT IN AN ORGANIZED HEALTH CARE EDUCATION/TRAINING PROGRAM

## 2024-10-22 PROCEDURE — 3008F BODY MASS INDEX DOCD: CPT | Performed by: STUDENT IN AN ORGANIZED HEALTH CARE EDUCATION/TRAINING PROGRAM

## 2024-10-22 PROCEDURE — 90480 ADMN SARSCOV2 VAC 1/ONLY CMP: CPT | Performed by: STUDENT IN AN ORGANIZED HEALTH CARE EDUCATION/TRAINING PROGRAM

## 2024-10-22 PROCEDURE — 90471 IMMUNIZATION ADMIN: CPT | Performed by: STUDENT IN AN ORGANIZED HEALTH CARE EDUCATION/TRAINING PROGRAM

## 2024-10-22 PROCEDURE — 90656 IIV3 VACC NO PRSV 0.5 ML IM: CPT | Performed by: STUDENT IN AN ORGANIZED HEALTH CARE EDUCATION/TRAINING PROGRAM

## 2024-10-22 PROCEDURE — 99396 PREV VISIT EST AGE 40-64: CPT | Performed by: STUDENT IN AN ORGANIZED HEALTH CARE EDUCATION/TRAINING PROGRAM

## 2024-10-22 PROCEDURE — 3079F DIAST BP 80-89 MM HG: CPT | Performed by: STUDENT IN AN ORGANIZED HEALTH CARE EDUCATION/TRAINING PROGRAM

## 2024-10-22 PROCEDURE — 91320 SARSCV2 VAC 30MCG TRS-SUC IM: CPT | Performed by: STUDENT IN AN ORGANIZED HEALTH CARE EDUCATION/TRAINING PROGRAM

## 2024-10-22 RX ORDER — TAMSULOSIN HYDROCHLORIDE 0.4 MG/1
0.4 CAPSULE ORAL DAILY
Qty: 30 CAPSULE | Refills: 11 | Status: SHIPPED | OUTPATIENT
Start: 2024-10-22 | End: 2025-10-22

## 2024-10-22 ASSESSMENT — PROMIS GLOBAL HEALTH SCALE
EMOTIONAL_PROBLEMS: SOMETIMES
RATE_AVERAGE_FATIGUE: MILD
CARRYOUT_SOCIAL_ACTIVITIES: VERY GOOD
CARRYOUT_PHYSICAL_ACTIVITIES: MOSTLY
RATE_AVERAGE_PAIN: 2
RATE_QUALITY_OF_LIFE: GOOD
RATE_PHYSICAL_HEALTH: FAIR
RATE_GENERAL_HEALTH: GOOD
RATE_MENTAL_HEALTH: GOOD
RATE_SOCIAL_SATISFACTION: VERY GOOD

## 2024-10-22 ASSESSMENT — PAIN SCALES - GENERAL: PAINLEVEL_OUTOF10: 0-NO PAIN

## 2024-10-22 NOTE — PROGRESS NOTES
"Subjective   Patient ID: Kale Rachel is a 54 y.o. male who presents for No chief complaint on file..    HPI   Doing well since last in. Work and family doing well.     Re: BPH - complains of LUTS. Feels that stream isn't very strong. Up 3-5x/night. Denies double voiding. Has tried limiting fluids, alcohol and caffeine but without much success. Requesting treatment and evaluation.     Re: HTN - Reports no sx high or low from HTN; denies blurry vision, HA, dizziness LoC CP SoB Galeas and leg swelling     Re: back - Subacute to chronic onset of low back pain. Seen by ortho. Had MRI lumbar spine; this was largely unremarkable.    Re: skin - see dermatology notes. Had a seborrheic keratosis. Reassurance given.     Re: HM - CRS due (cologuard last done 2021). PSA as above.  Flu and COVID due.     PMHx, FHx, Social Hx, Surg Hx personally reviewed at this appointment. No pertinent findings and/or changes from prior (if applicable).    ROS: Denies wt gain/loss f/c HA LoC CP SOB NVDC. See HPI above, and scanned sheet (if applicable). All other systems are reviewed and are without complaint.     Review of Systems    Objective   /82   Pulse 65   Temp 36.3 °C (97.3 °F)   Ht 1.676 m (5' 6\")   Wt 95.7 kg (211 lb)   SpO2 96%   BMI 34.06 kg/m²     Physical Exam  Gen: well developed in NAD. AAO x3.  HEENT: NC/AT. Anicteric sclera, symmetric pupils. MMM no thrush.  Neck: Soft, supple. No LAD. No goiter.   CV: RRR nl s1s2 no m/r/g  Pulm: CTAB no w/r/r, good air exchange  GI: soft NTND BS+ no hsm  Ext: WWP no edema  Neuro: II-XII grossly intact, nonfocal systemic findings  MSK: 5/5 strength b/l UE and LE  Gait: unremarkable     Lab Results   Component Value Date    WBC 6.7 09/20/2023    HGB 15.9 09/20/2023    HCT 47.9 09/20/2023     09/20/2023    CHOL 204 (H) 09/20/2023    TRIG 115 09/20/2023    HDL 55.3 09/20/2023    ALT 28 09/20/2023    AST 20 09/20/2023     09/20/2023    K 4.5 09/20/2023     09/20/2023 "    CREATININE 1.17 09/20/2023    BUN 17 09/20/2023    CO2 28 09/20/2023    TSH 0.95 05/09/2019    PSA 4.28 (H) 12/04/2023    HGBA1C 5.2 09/20/2023       MR lumbar spine wo IV contrast  Narrative: Interpreted By:  Aimee Kumari,   STUDY:  MRI of the lumbar spine without IV contrast;  8/19/2024 2:14 pm      INDICATION:  Signs/Symptoms:RADICULOPATHY.      COMPARISON:  None.      ACCESSION NUMBER(S):  [id number]      ORDERING CLINICIAN:  MARCELA ALFARO      TECHNIQUE:  Sagittal and axial STIR and T1-weighted MRI images of the lumbar  spine were acquired using a spondylolysis protocol.  No contrast was  administered.      FINDINGS:  For counting purposes the last lumbarized vertebral body is labeled  L5. There is trace retrolisthesis of L4 on L5.      Alignment, vertebral body heights and marrow signal pattern are  within normal limits.      There is desiccated disc signal throughout the lumbar spine without  significant loss of disc height.      The conus terminates at L1-L2 and is unremarkable. Prevertebral soft  tissues are not thickened.      Evaluation by level:      T12-L1: Disc bulge with a superimposed central disc extrusion which  extends along the inferior T12 vertebral body. No spinal canal  stenosis. No neural foraminal stenosis.      L1-L2: Disc bulge with an annular fissure May and facet arthrosis. No  spinal canal stenosis. No neural foraminal stenosis.      L2-L3: Disc bulge and facet arthrosis. No spinal canal stenosis. Mild  neural foraminal stenosis.      L3-L4: Disc bulge with a superimposed right foraminal protrusion with  an annular fissure and facet arthrosis. No spinal canal stenosis.  Mild left and mild-to-moderate right neural foraminal stenosis. There  may be abutment of the exiting right L3 nerve root. Please correlate  clinically for symptoms of right L3 radiculopathy.      L4-L5: Disc bulge and facet arthrosis. No spinal canal stenosis. Mild  bilateral neural foraminal stenosis       L5-S1: Disc bulge with a superimposed central disc protrusion and  bilateral facet arthrosis. No spinal canal stenosis. Mild right and  mild-to-moderate left neural foraminal stenosis.      Impression: Multilevel degenerative disc disease and facet arthrosis without  significant spinal canal stenosis. Varying degrees of mild neural  foraminal narrowing as detailed above most pronounced at L3-L4 and  L5-S1.      I personally reviewed the images/study and I agree with the findings  as stated. This study was interpreted at [facility], [place], [place].      MACRO:  None      Signed by: Aimee Kumari 8/20/2024 4:04 PM  Dictation workstation:   [id number]     par    Current Outpatient Medications   Medication Instructions    amLODIPine (NORVASC) 10 mg, oral, Daily, as directed    fluticasone (Flonase) 50 mcg/actuation nasal spray 1 spray, nasal, 2 times daily    hydroCHLOROthiazide (HYDRODIURIL) 25 mg, oral, Daily    lisinopril 20 mg, oral, Daily    meloxicam (MOBIC) 15 mg, oral, Daily PRN    tamsulosin (FLOMAX) 0.4 mg, oral, Daily      Assessment/Plan   Overall stable health. Trial of flomax for BPH symptoms.     # HTN: at/near goal  - continue current regimen  - routine lab work if not recent  - continue lifestyle modifications     # Obesity: BMI > 30  - counselled on wt loss via diet, exercise, and other lifestyle modifications     # BPH: mild-mod symptoms  - re-initiate flomax  - f/u urology PRN     # Health Maintenance  - routine blood work  - Colon Cancer Screening: repeat Cologuard  - PSA: see above, ordered  - Immunizations:  COVID and flu  - AAA screening:  not indicated

## 2024-10-22 NOTE — PATIENT INSTRUCTIONS
Please stop at any  lab ([address] if you choose to use my building) to complete your blood and/or urine work that I've ordered for you.    I will contact you with the results at my soonest convenience. I strongly urge you to use [employer] as this is the quickest and easiest way to access your results and receive my correspondences.     I have added or changed your medications today. See the medication section of this packet for full details.      I have ordered Cologuard to screen you for colon cancer. You will receive a kit at home.     I recommend that you lose weight via lifestyle modifications such as diet and exercise.     You received your flu shot today.    You received your COVID-19 booster today.    See me yearly for a complete physical exam, and sooner as needed for sick visits

## 2024-11-17 DIAGNOSIS — I10 BENIGN ESSENTIAL HYPERTENSION: ICD-10-CM

## 2024-11-18 DIAGNOSIS — I10 BENIGN ESSENTIAL HYPERTENSION: ICD-10-CM

## 2024-11-18 RX ORDER — HYDROCHLOROTHIAZIDE 25 MG/1
25 TABLET ORAL DAILY
Qty: 90 TABLET | Refills: 2 | OUTPATIENT
Start: 2024-11-18

## 2024-11-19 ENCOUNTER — LAB (OUTPATIENT)
Dept: LAB | Facility: LAB | Age: 54
End: 2024-11-19
Payer: COMMERCIAL

## 2024-11-19 ENCOUNTER — OFFICE VISIT (OUTPATIENT)
Dept: URGENT CARE | Age: 54
End: 2024-11-19
Payer: COMMERCIAL

## 2024-11-19 VITALS
TEMPERATURE: 97.8 F | BODY MASS INDEX: 33.41 KG/M2 | WEIGHT: 207 LBS | SYSTOLIC BLOOD PRESSURE: 137 MMHG | RESPIRATION RATE: 16 BRPM | DIASTOLIC BLOOD PRESSURE: 90 MMHG | OXYGEN SATURATION: 98 % | HEART RATE: 62 BPM

## 2024-11-19 DIAGNOSIS — N40.1 BENIGN PROSTATIC HYPERPLASIA WITH NOCTURIA: ICD-10-CM

## 2024-11-19 DIAGNOSIS — Z00.00 HEALTHCARE MAINTENANCE: ICD-10-CM

## 2024-11-19 DIAGNOSIS — R35.1 BENIGN PROSTATIC HYPERPLASIA WITH NOCTURIA: ICD-10-CM

## 2024-11-19 DIAGNOSIS — R97.20 ELEVATED PSA: ICD-10-CM

## 2024-11-19 DIAGNOSIS — H61.22 IMPACTED CERUMEN, LEFT EAR: Primary | ICD-10-CM

## 2024-11-19 DIAGNOSIS — H60.393 OTHER INFECTIVE OTITIS EXTERNA OF BOTH EARS, UNSPECIFIED CHRONICITY: ICD-10-CM

## 2024-11-19 LAB
ALBUMIN SERPL BCP-MCNC: 4.4 G/DL (ref 3.4–5)
ALP SERPL-CCNC: 45 U/L (ref 33–120)
ALT SERPL W P-5'-P-CCNC: 23 U/L (ref 10–52)
ANION GAP SERPL CALC-SCNC: 12 MMOL/L (ref 10–20)
APPEARANCE UR: CLEAR
AST SERPL W P-5'-P-CCNC: 23 U/L (ref 9–39)
BASOPHILS # BLD AUTO: 0.04 X10*3/UL (ref 0–0.1)
BASOPHILS NFR BLD AUTO: 0.7 %
BILIRUB SERPL-MCNC: 0.8 MG/DL (ref 0–1.2)
BILIRUB UR STRIP.AUTO-MCNC: NEGATIVE MG/DL
BUN SERPL-MCNC: 20 MG/DL (ref 6–23)
CALCIUM SERPL-MCNC: 10.3 MG/DL (ref 8.6–10.6)
CHLORIDE SERPL-SCNC: 100 MMOL/L (ref 98–107)
CHOLEST SERPL-MCNC: 200 MG/DL (ref 0–199)
CHOLESTEROL/HDL RATIO: 3.9
CO2 SERPL-SCNC: 32 MMOL/L (ref 21–32)
COLOR UR: NORMAL
CREAT SERPL-MCNC: 1.18 MG/DL (ref 0.5–1.3)
EGFRCR SERPLBLD CKD-EPI 2021: 73 ML/MIN/1.73M*2
EOSINOPHIL # BLD AUTO: 0.09 X10*3/UL (ref 0–0.7)
EOSINOPHIL NFR BLD AUTO: 1.5 %
ERYTHROCYTE [DISTWIDTH] IN BLOOD BY AUTOMATED COUNT: 11.9 % (ref 11.5–14.5)
GLUCOSE SERPL-MCNC: 94 MG/DL (ref 74–99)
GLUCOSE UR STRIP.AUTO-MCNC: NORMAL MG/DL
HCT VFR BLD AUTO: 44.2 % (ref 41–52)
HDLC SERPL-MCNC: 51.4 MG/DL
HGB BLD-MCNC: 14.9 G/DL (ref 13.5–17.5)
HOLD SPECIMEN: NORMAL
IMM GRANULOCYTES # BLD AUTO: 0.02 X10*3/UL (ref 0–0.7)
IMM GRANULOCYTES NFR BLD AUTO: 0.3 % (ref 0–0.9)
KETONES UR STRIP.AUTO-MCNC: NEGATIVE MG/DL
LDLC SERPL CALC-MCNC: 114 MG/DL
LEUKOCYTE ESTERASE UR QL STRIP.AUTO: NEGATIVE
LYMPHOCYTES # BLD AUTO: 1.99 X10*3/UL (ref 1.2–4.8)
LYMPHOCYTES NFR BLD AUTO: 32.4 %
MCH RBC QN AUTO: 29.2 PG (ref 26–34)
MCHC RBC AUTO-ENTMCNC: 33.7 G/DL (ref 32–36)
MCV RBC AUTO: 87 FL (ref 80–100)
MONOCYTES # BLD AUTO: 0.62 X10*3/UL (ref 0.1–1)
MONOCYTES NFR BLD AUTO: 10.1 %
NEUTROPHILS # BLD AUTO: 3.39 X10*3/UL (ref 1.2–7.7)
NEUTROPHILS NFR BLD AUTO: 55 %
NITRITE UR QL STRIP.AUTO: NEGATIVE
NON HDL CHOLESTEROL: 149 MG/DL (ref 0–149)
NRBC BLD-RTO: 0 /100 WBCS (ref 0–0)
PH UR STRIP.AUTO: 7.5 [PH]
PLATELET # BLD AUTO: 258 X10*3/UL (ref 150–450)
POTASSIUM SERPL-SCNC: 5 MMOL/L (ref 3.5–5.3)
PROT SERPL-MCNC: 7.4 G/DL (ref 6.4–8.2)
PROT UR STRIP.AUTO-MCNC: NEGATIVE MG/DL
PSA SERPL-MCNC: 3.87 NG/ML
RBC # BLD AUTO: 5.1 X10*6/UL (ref 4.5–5.9)
RBC # UR STRIP.AUTO: NEGATIVE /UL
SODIUM SERPL-SCNC: 139 MMOL/L (ref 136–145)
SP GR UR STRIP.AUTO: 1.02
TRIGL SERPL-MCNC: 173 MG/DL (ref 0–149)
UROBILINOGEN UR STRIP.AUTO-MCNC: NORMAL MG/DL
VLDL: 35 MG/DL (ref 0–40)
WBC # BLD AUTO: 6.2 X10*3/UL (ref 4.4–11.3)

## 2024-11-19 PROCEDURE — 81003 URINALYSIS AUTO W/O SCOPE: CPT

## 2024-11-19 PROCEDURE — 36415 COLL VENOUS BLD VENIPUNCTURE: CPT

## 2024-11-19 PROCEDURE — 80053 COMPREHEN METABOLIC PANEL: CPT

## 2024-11-19 PROCEDURE — 84153 ASSAY OF PSA TOTAL: CPT

## 2024-11-19 PROCEDURE — 85025 COMPLETE CBC W/AUTO DIFF WBC: CPT

## 2024-11-19 PROCEDURE — 80061 LIPID PANEL: CPT

## 2024-11-19 RX ORDER — CIPROFLOXACIN AND DEXAMETHASONE 3; 1 MG/ML; MG/ML
4 SUSPENSION/ DROPS AURICULAR (OTIC) 2 TIMES DAILY
Qty: 7.5 ML | Refills: 0 | Status: SHIPPED | OUTPATIENT
Start: 2024-11-19 | End: 2024-11-26

## 2024-11-19 ASSESSMENT — PATIENT HEALTH QUESTIONNAIRE - PHQ9
SUM OF ALL RESPONSES TO PHQ9 QUESTIONS 1 AND 2: 0
1. LITTLE INTEREST OR PLEASURE IN DOING THINGS: NOT AT ALL
2. FEELING DOWN, DEPRESSED OR HOPELESS: NOT AT ALL

## 2024-11-19 ASSESSMENT — PAIN SCALES - GENERAL: PAINLEVEL_OUTOF10: 5

## 2024-11-19 NOTE — PROGRESS NOTES
Subjective   Patient ID: Kale Rachel is a 54 y.o. male. They present today with a chief complaint of Earache (Left ear pain feels congested for 4 days).    History of Present Illness  HPI    Past Medical History  Allergies as of 11/19/2024    (No Known Allergies)       (Not in a hospital admission)       Past Medical History:   Diagnosis Date    Elevated PSA 09/20/2023    Impacted cerumen 09/20/2023       Past Surgical History:   Procedure Laterality Date    APPENDECTOMY  05/07/2015    Appendectomy        reports that he has never smoked. He has never used smokeless tobacco. He reports that he does not drink alcohol and does not use drugs.    Review of Systems  Review of Systems                               Objective    Vitals:    11/19/24 0825   BP: 137/90   Pulse: 62   Resp: 16   Temp: 36.6 °C (97.8 °F)   SpO2: 98%   Weight: 93.9 kg (207 lb)     No LMP for male patient.    Physical Exam    Procedures    Point of Care Test & Imaging Results from this visit  No results found for this visit on 11/19/24.   No results found.    Diagnostic study results (if any) were reviewed by Zainab Hernandez.    Assessment/Plan   Allergies, medications, history, and pertinent labs/EKGs/Imaging reviewed by Zainab Hernandez.     Medical Decision Making  ***    Orders and Diagnoses  Diagnoses and all orders for this visit:  Impacted cerumen, left ear  -     Ear Cerumen Removal  Other infective otitis externa of both ears, unspecified chronicity  -     ciprofloxacin-dexamethasone (Ciprodex) otic suspension; Administer 4 drops into each ear 2 times a day for 7 days.  -     Referral to ENT; Future      Medical Admin Record      Patient disposition: { Disposition:33445}    Electronically signed by Zainab Hernandez  3:20 PM

## 2024-11-19 NOTE — PATIENT INSTRUCTIONS
Go to the emergency department for severe symptoms.    Follow-up with ear, nose, throat specialist, referral done.  If ears are completely clear of symptoms, please call to cancel the scheduled appointment as discussed.    It is hard to tell if ear infection is bacterial or fungal in nature, thus the referral if not improved.  You are prescribed a medication that we will target the bacterial external ear infection but it has some partial coverage for fungal ear infection.    When you instill the eardrops, lay on the opposite side to let it soak for up to 10 minutes for full effect.    Do not use Q-tips, avoid inserting anything in the ears at this time.    Acetaminophen 500 mg (Extra StrengthTylenol), 2 tablets every 6 hours as needed for fever or pain.

## 2024-11-19 NOTE — PROGRESS NOTES
Subjective   Patient ID: Kale Rachel is a 54 y.o. male. They present today with a chief complaint of Earache (Left ear pain feels congested for 4 days).    History of Present Illness    Earache         Past Medical History  Allergies as of 11/19/2024    (No Known Allergies)       (Not in a hospital admission)       Past Medical History:   Diagnosis Date    Elevated PSA 09/20/2023    Impacted cerumen 09/20/2023       Past Surgical History:   Procedure Laterality Date    APPENDECTOMY  05/07/2015    Appendectomy        reports that he has never smoked. He has never used smokeless tobacco. He reports that he does not drink alcohol and does not use drugs.    Review of Systems  Review of Systems   HENT:  Positive for ear pain.                                   Objective    Vitals:    11/19/24 0825   BP: 137/90   Pulse: 62   Resp: 16   Temp: 36.6 °C (97.8 °F)   SpO2: 98%   Weight: 93.9 kg (207 lb)     No LMP for male patient.    Physical Exam    Ear Cerumen Removal    Date/Time: 11/19/2024 9:28 AM    Performed by: Nancy Ugarte RN  Authorized by: Zainab Hernandez    Consent:     Consent obtained:  Verbal    Consent given by:  Patient    Risks, benefits, and alternatives were discussed: yes      Risks discussed:  Bleeding and dizziness    Alternatives discussed:  No treatment  Universal protocol:     Procedure explained and questions answered to patient or proxy's satisfaction: yes      Relevant documents present and verified: yes      Patient identity confirmed:  Verbally with patient  Procedure details:     Location:  L ear    Procedure type: irrigation      Procedure outcomes: cerumen removed    Post-procedure details:     Inspection:  Ear canal clear    Hearing quality:  Improved    Procedure completion:  Tolerated      Point of Care Test & Imaging Results from this visit  No results found for this visit on 11/19/24.   No results found.    Diagnostic study results (if any) were reviewed by Zainab GZUMAN  Mary.    Assessment/Plan   Allergies, medications, history, and pertinent labs/EKGs/Imaging reviewed by Nancy Ugarte RN.     Medical Decision Making    Orders and Diagnoses  Diagnoses and all orders for this visit:  Impacted cerumen, left ear  Other infective otitis externa of both ears, unspecified chronicity  -     ciprofloxacin-dexamethasone (Ciprodex) otic suspension; Administer 4 drops into each ear 2 times a day for 7 days.  -     Referral to ENT; Future      Medical Admin Record      Patient disposition: Home    Electronically signed by Zainab Hernandez  9:28 AM

## 2024-11-19 NOTE — PROGRESS NOTES
Subjective   Patient ID: Kale Rachel is a 54 y.o. male. They present today with a chief complaint of Earache (Left ear pain feels congested for 4 days).    History of Present Illness  HPI    54-year-old male with history of hearing loss and tinnitus, here because of sensation of wax buildup in both ears x 4 days but the left is worse, now hurting.  He has tried using water mixed with hydrogen peroxide to both ears without significant relief of symptoms.  Patient states a couple of weeks ago he had nasal congestion but this is now resolved.    He had history of seeing ENT once for tinnitus.  Patient states he had extensive testing and his hearing test came back normal.  He has no known prior history of fungal ear infection.      Past Medical History  Allergies as of 11/19/2024    (No Known Allergies)       (Not in a hospital admission)       Past Medical History:   Diagnosis Date    Elevated PSA 09/20/2023    Impacted cerumen 09/20/2023       Past Surgical History:   Procedure Laterality Date    APPENDECTOMY  05/07/2015    Appendectomy        reports that he has never smoked. He has never used smokeless tobacco. He reports that he does not drink alcohol and does not use drugs.    Review of Systems  Review of Systems                               Objective    Vitals:    11/19/24 0825   BP: 137/90   Pulse: 62   Resp: 16   Temp: 36.6 °C (97.8 °F)   SpO2: 98%   Weight: 93.9 kg (207 lb)     No LMP for male patient.    Physical Exam    Constitutional: Vital signs reviewed. Well developed and well nourished. Patient alert and without distress.     Head and Face: Normal, no swelling, lesions or signs of trauma.    Ears External inspection of ears: Normal. Otoscopic exam: Left ear canal filled with wax or exudate, some tenderness on manipulation, unable to view the TM.  Right ear canal with cerumen but no impaction.  There are white plaques (cerumen discolored by hydrogen peroxide) versus pseudohyphae.  TM is seen, there  is a slight retraction but no redness or effusion noted.  No perforation. Both ear canals are mildly swollen but still patent.    Neurologic: Cortical function: Normal      Procedures    RN irrigated the left ear canal, removed most of the wax but has to stop due to patient discomfort.  On my recheck, most of the wax has been removed, TM is visualized. No signs of infection, no perforation. Ear canal is swollen with redness and exudates. NO vesicular lesions.     Point of Care Test & Imaging Results from this visit  No results found for this visit on 11/19/24.   No results found.    Diagnostic study results (if any) were reviewed by Zainab Hernandez.    Assessment/Plan   Allergies, medications, history, and pertinent labs/EKGs/Imaging reviewed by Zainab Hernandez.     Medical Decision Making  Bacterial vs Fungal otitis externa. Follow-up with ENT. Ciprodex prescribed (acidic).  See discharge instructions for details.     Orders and Diagnoses  Diagnoses and all orders for this visit:  Impacted cerumen, left ear  Other infective otitis externa of both ears, unspecified chronicity  -     ciprofloxacin-dexamethasone (Ciprodex) otic suspension; Administer 4 drops into each ear 2 times a day for 7 days.  -     Referral to ENT; Future  Other orders  -     Ear Cerumen Removal      Medical Admin Record      Patient disposition: Home    Electronically signed by Zainab Hernandez  3:19 PM

## 2024-12-01 LAB — NONINV COLON CA DNA+OCC BLD SCRN STL QL: NEGATIVE

## 2024-12-04 DIAGNOSIS — I10 BENIGN ESSENTIAL HYPERTENSION: ICD-10-CM

## 2024-12-04 RX ORDER — LISINOPRIL 20 MG/1
20 TABLET ORAL DAILY
Qty: 90 TABLET | Refills: 2 | Status: SHIPPED | OUTPATIENT
Start: 2024-12-04

## 2024-12-09 DIAGNOSIS — I10 ESSENTIAL (PRIMARY) HYPERTENSION: ICD-10-CM

## 2024-12-10 RX ORDER — AMLODIPINE BESYLATE 10 MG/1
10 TABLET ORAL DAILY
Qty: 90 TABLET | Refills: 3 | Status: SHIPPED | OUTPATIENT
Start: 2024-12-10

## 2025-01-29 ENCOUNTER — APPOINTMENT (OUTPATIENT)
Dept: OTOLARYNGOLOGY | Facility: CLINIC | Age: 55
End: 2025-01-29
Payer: COMMERCIAL

## 2025-02-12 DIAGNOSIS — I10 BENIGN ESSENTIAL HYPERTENSION: ICD-10-CM

## 2025-02-12 RX ORDER — HYDROCHLOROTHIAZIDE 25 MG/1
25 TABLET ORAL DAILY
Qty: 90 TABLET | Refills: 2 | OUTPATIENT
Start: 2025-02-12

## 2025-02-13 DIAGNOSIS — I10 BENIGN ESSENTIAL HYPERTENSION: ICD-10-CM

## 2025-02-14 RX ORDER — HYDROCHLOROTHIAZIDE 25 MG/1
25 TABLET ORAL DAILY
Qty: 90 TABLET | Refills: 2 | Status: SHIPPED | OUTPATIENT
Start: 2025-02-14

## 2025-03-04 ENCOUNTER — APPOINTMENT (OUTPATIENT)
Dept: OTOLARYNGOLOGY | Facility: HOSPITAL | Age: 55
End: 2025-03-04
Payer: COMMERCIAL

## 2025-03-04 ENCOUNTER — PATIENT MESSAGE (OUTPATIENT)
Dept: PRIMARY CARE | Facility: CLINIC | Age: 55
End: 2025-03-04
Payer: COMMERCIAL

## 2025-03-04 DIAGNOSIS — N52.9 ERECTILE DYSFUNCTION, UNSPECIFIED ERECTILE DYSFUNCTION TYPE: Primary | ICD-10-CM

## 2025-03-04 RX ORDER — SILDENAFIL 50 MG/1
50 TABLET, FILM COATED ORAL DAILY PRN
Qty: 12 TABLET | Refills: 3 | Status: SHIPPED | OUTPATIENT
Start: 2025-03-04 | End: 2026-03-04

## 2025-05-06 ENCOUNTER — APPOINTMENT (OUTPATIENT)
Dept: OTOLARYNGOLOGY | Facility: HOSPITAL | Age: 55
End: 2025-05-06
Payer: COMMERCIAL

## 2025-08-07 DIAGNOSIS — I10 BENIGN ESSENTIAL HYPERTENSION: ICD-10-CM

## 2025-08-07 RX ORDER — LISINOPRIL 20 MG/1
20 TABLET ORAL DAILY
Qty: 90 TABLET | Refills: 2 | Status: SHIPPED | OUTPATIENT
Start: 2025-08-07

## 2025-09-03 ENCOUNTER — APPOINTMENT (OUTPATIENT)
Dept: PRIMARY CARE | Facility: CLINIC | Age: 55
End: 2025-09-03
Payer: COMMERCIAL

## 2025-09-03 ASSESSMENT — PAIN SCALES - GENERAL: PAINLEVEL_OUTOF10: 0-NO PAIN
